# Patient Record
Sex: MALE | Race: WHITE | NOT HISPANIC OR LATINO | URBAN - METROPOLITAN AREA
[De-identification: names, ages, dates, MRNs, and addresses within clinical notes are randomized per-mention and may not be internally consistent; named-entity substitution may affect disease eponyms.]

---

## 2017-03-08 ENCOUNTER — IMPORTED ENCOUNTER (OUTPATIENT)
Dept: URBAN - METROPOLITAN AREA CLINIC 31 | Facility: CLINIC | Age: 27
End: 2017-03-08

## 2017-03-08 PROCEDURE — 92310 CONTACT LENS FITTING OU: CPT

## 2017-03-08 PROCEDURE — 92015 DETERMINE REFRACTIVE STATE: CPT

## 2017-03-08 PROCEDURE — 92004 COMPRE OPH EXAM NEW PT 1/>: CPT

## 2017-03-08 NOTE — PATIENT DISCUSSION
1.  Refractive error Change glasses. Dispense trial contacts OU. To discontinue and call if any problems. 2.  Return for an appointment in 12 months for comprehensive exam. with Dr. Breanna Simms.

## 2018-10-15 NOTE — PATIENT DISCUSSION
Allergy testing done. Multiple - bayberry, weeds, molds. melaluca pollen, dog, and cats.  Avoidance advised.  Follow with allergist.

## 2020-07-29 NOTE — PATIENT DISCUSSION
Cataract surgery has been performed in the first eye and activities of daily living are still impaired. The patient would like to proceed with cataract surgery in the second eye as scheduled. The patient elects CV Toric, goal of Flavia.

## 2021-01-26 NOTE — PROCEDURE NOTE: SURGICAL
<p>Prior to commencing surgery patient identification, surgical procedure, site, and side were confirmed by Dr. Correa.&nbsp; Following topical proparacaine anesthesia, the patient was positioned at the YAG laser, a contact lens coupled to the cornea of the right eye with methylcellulose and an axial posterior capsulotomy performed without complication using 3.4 Mj x 33. Attention was then turned to the left eye and a contact lens coupled to the cornea of the left eye with methylcellulose and an axial posterior capsulotomy performed without complication using 3.6 Mj x 15. One drop of Alphagan was instilled in both eyes and the patient returned to the holding area having tolerated the procedure well and without complication. </p><p>MRN:[id number]</p>

## 2022-02-16 NOTE — PATIENT DISCUSSION
#2. Is This A New Presentation, Or A Follow-Up?: Follow Up Psoriasis Additional History: Pt would like to know if she can increase Otezla dosage.  Currently takes 30mg bid, no problems with med-needs refills.  The Bryhali does not clear the stubborn patches on legs.

## 2022-04-01 ASSESSMENT — VISUAL ACUITY
OS_SC: 20/20-1
OD_SC: 20/20
OD_CC: 20/400
OS_CC: 20/400

## 2022-04-01 ASSESSMENT — TONOMETRY
OD_IOP_MMHG: 14
OS_IOP_MMHG: 14

## 2024-02-22 NOTE — PATIENT DISCUSSION
Chronic pain - Notes pain in RT knee pain for 3 years ago. Patient has tried tylenol, Voltaren with some relief. Discussed chiropractor, massage, acupuncture, hot/cold therapy, topical pain relievers, TENs, PT. Denies injury to the knee. Hasn't done PT. 9/29/2023: 1.  Lucency of the medial femoral condyle of the right knee which may represent osteochondral defect versus degenerative subchondral cyst.  Recommend MRI for further evaluation.  Moderate degenerative change of the medial joint compartment. 2.  Moderate to severe degenerative change of the medial joint compartment of the left knee.   MRI ordered. Told to use knee brace. Will f/u after MRI   Patient made aware of 24/7 emergency services.

## 2024-10-15 NOTE — PATIENT DISCUSSION
Matthew Candelario is a 50 y.o. male on day 6 of admission presenting with Multiple myeloma not having achieved remission (Multi).    Subjective     Afebrile, no overnight events. C/o feeling groggy and achy today. He reports having diarrhea overnight. Worked with PT today. Denies SOB, abdominal pain, or N/V/C. ROS otherwise unremarkable.      Objective     Physical Exam  Constitutional:       Appearance: Normal appearance. He is normal weight.   HENT:      Head: Normocephalic.      Mouth/Throat:      Mouth: Mucous membranes are moist.      Pharynx: Oropharynx is clear.   Eyes:      Pupils: Pupils are equal, round, and reactive to light.   Cardiovascular:      Rate and Rhythm: Normal rate and regular rhythm.   Pulmonary:      Effort: Pulmonary effort is normal.      Breath sounds: Normal breath sounds.   Abdominal:      General: Bowel sounds are normal.      Palpations: Abdomen is soft.   Musculoskeletal:         General: Normal range of motion.      Cervical back: Normal range of motion.   Skin:     General: Skin is warm and dry.   Neurological:      General: No focal deficit present.      Mental Status: He is alert and oriented to person, place, and time.   Psychiatric:         Mood and Affect: Mood normal.         Behavior: Behavior normal.       Assessment/Plan   Assessment & Plan  Multiple myeloma not having achieved remission (Multi)    Mr. Matthew Candelario is a 50 yr old male with IgG lamda multiple myeloma, being admitted for Autologous PBSCT (T=0 on 10/11/24), prepped with Melphalan 200 mg/m2. PMHx htn, HLD, DM II, Major Depressive Disorder, Schizoaffective disorder, PTSD, chemo induced neuropathy.    Currently (10/15/24) T+ 4 autologous PBST conditioned with melphalan 200 mg/m2.      ONC:   # IgG lambda multiple myeloma  - Presented with extensive osteolytic lesions of appendicular and axial skeleton c/f metastatic disease, L femoral neck lytic lesion with cortical thinning c/f impending pathologic fx in April  Order OCT macula.   - S/p bilateral femur fixation on  &  to prevent further fractures (at OSH)  - SPEP, UPEP, PSA- 0.35  - () IgG 294, IgM < 5, IgA 25,   - Kappa Free LC- 0.62, Lambda Free LC - 78.19  - PET CT : A large lytic lesion is seen in the left femoral neck, with hypermetabolic activity, concerning for increased risk of fracture. Extensive lytic lesions are seen throughout the axial and appendicular skeleton as described above, compatible with myelomatous involvement.  - Results of PET discussed with orthopedic surgery  and no further intervention since he is s/p bilat femur fixation  - BMBx 24-no morphologic evidence of plasma cell neoplasm  - S/p 6 cycles Laila RVD (Velcade D/C after C4 due to severe neuropathy; Revlimid held since 24) w/ VGPR  - Now admitted for AutoSCT prepped with Ivette 200, T0=10/11/24     CHEMO:  BSA 2.25  - Melphalan 200 mg/m2 on T-1  - Cells collected: 4.73 x 10^6 CD34     Transplant Surveillance  - Ig (10/10)  - (10/10) Coag wnl/Fibrinogen: 392  - LDH (10/10): 198, Hapto 106  - Urinalysis wnl  - Urine Protein/Creatinine Ratio: Weekly      HEME:  - Admit H&H-10..3, plt 131  - No signs of active bleeding on admit  - Monitor and transfuse for Hgb <7, plt <10  - Has hx Laila use, will need longer time to match     FEN/GI:  - Admit wt: 106 kg, Most recent wt: 104 kg (10/14)  - Admit sCr-0.66  - Hypophosphatemia repleted last on 10/15  - Loose stools. Cdiff ordered (10/15).     ID:  - No s/s of infection on admit  - Plan for Levaquin for ANC <500  - Plan for Pip-Tazo for Neutropenic fever  PPX  - Continue home Acyclovir 400mg Q12 hours  - Start Fluconazole 400mg daily on T+1  - Start Bactrim 800/160mg MWF on T+30     CARDIO:  # Hx of HTN/HLD  - Continue home Lovastatin (as Atorvastatin inpatient)  - Held home Amlodipine/Valsartan/HCTZ  - Last echo 24-LVEF 70-75%  - Per outpatient cardiology, no contraindication in proceeding from cardiac standpoint     PSYCH:  # Hx  of Schizoaffective disorder  # Hx of Sunil Depressive Disorder  # Hx of PTSD  - Continue home Mirtazipine  - Follows with outpatient Psych on [place]     ENDO:  # Hx of T2DM with neuropathy  - Hgb A1C-5.7 (4/4)  - Home regimen: Metformin (currently on hold)  - Start mild ISS  - Continue home Gabapentin 600mg BID     :  # Urinary frequency  - UA (10/13/24): no signs of infection  # Hx of BPH  - Continue home Tamsulosin 0.4 mg daily     DISPO  - Full code-confirmed on admission  - Access-Rt Trialysis catheter  - Follows with Dr. Cotton  - Caregiver: Sister, Mckayla    Pt seen, examined, and discussed with Dr. Vince Child.    Lita Puente PAHunterC  Malignant Hematology (Lymphoma/Myeloma/Autologous SCT) Team